# Patient Record
Sex: MALE | Race: WHITE
[De-identification: names, ages, dates, MRNs, and addresses within clinical notes are randomized per-mention and may not be internally consistent; named-entity substitution may affect disease eponyms.]

---

## 2022-03-29 ENCOUNTER — HOSPITAL ENCOUNTER (EMERGENCY)
Dept: HOSPITAL 4 - SED | Age: 22
Discharge: HOME | End: 2022-03-29
Payer: SELF-PAY

## 2022-03-29 VITALS — BODY MASS INDEX: 34.86 KG/M2 | WEIGHT: 230 LBS | HEIGHT: 68 IN

## 2022-03-29 VITALS — SYSTOLIC BLOOD PRESSURE: 133 MMHG

## 2022-03-29 DIAGNOSIS — J20.8: Primary | ICD-10-CM

## 2022-03-29 DIAGNOSIS — Z79.899: ICD-10-CM

## 2022-03-29 NOTE — NUR
Patient given written and verbal discharge instructions and verbalizes 
understanding.  ER MD discussed with patient the results and treatment 
provided. Patient in stable condition. ID arm band removed.

Rx of Prednisone and Promethazine/codeine   given. Patient educated on pain 
management and to follow up with PMD. 

Opportunity for questions provided and answered. Medication side effect fact 
sheet provided.

## 2023-02-18 ENCOUNTER — HOSPITAL ENCOUNTER (EMERGENCY)
Dept: HOSPITAL 4 - SED | Age: 23
Discharge: HOME | End: 2023-02-18
Payer: SELF-PAY

## 2023-02-18 VITALS — SYSTOLIC BLOOD PRESSURE: 136 MMHG

## 2023-02-18 VITALS — WEIGHT: 230 LBS | BODY MASS INDEX: 34.86 KG/M2 | HEIGHT: 68 IN

## 2023-02-18 DIAGNOSIS — Z79.899: ICD-10-CM

## 2023-02-18 DIAGNOSIS — J45.909: ICD-10-CM

## 2023-02-18 DIAGNOSIS — R05.9: Primary | ICD-10-CM
